# Patient Record
Sex: MALE | NOT HISPANIC OR LATINO
[De-identification: names, ages, dates, MRNs, and addresses within clinical notes are randomized per-mention and may not be internally consistent; named-entity substitution may affect disease eponyms.]

---

## 2022-04-18 PROBLEM — Z00.00 ENCOUNTER FOR PREVENTIVE HEALTH EXAMINATION: Status: ACTIVE | Noted: 2022-04-18

## 2022-04-28 ENCOUNTER — APPOINTMENT (OUTPATIENT)
Dept: UROLOGY | Facility: CLINIC | Age: 32
End: 2022-04-28

## 2022-05-10 ENCOUNTER — APPOINTMENT (OUTPATIENT)
Dept: UROLOGY | Facility: CLINIC | Age: 32
End: 2022-05-10

## 2022-05-17 ENCOUNTER — APPOINTMENT (OUTPATIENT)
Dept: UROLOGY | Facility: CLINIC | Age: 32
End: 2022-05-17
Payer: MEDICAID

## 2022-05-17 PROCEDURE — 99204 OFFICE O/P NEW MOD 45 MIN: CPT | Mod: 95

## 2022-05-17 NOTE — HISTORY OF PRESENT ILLNESS
[FreeTextEntry1] : Patient is a 32-year-old gentleman with a 3-1/2-year-old child who presents with the chief complaint of rapid ejaculation for evaluation. I reviewed the questionnaire he had completed with him in detail.  The patient states this has been present for many years.  He has tried using a condom, anesthetic spray, start and stop technique which have all not decreased his rapid ejaculation.  He states his he ejaculates shortly, but certainly less than a minute, after penetration.  The only thing that seems to improve his latency time is alcohol.  He does not want to use alcohol though for this purpose.  The patient denies fevers, chills, nausea and/or vomiting and no unexplained weight loss. He has no known drug allergies.  His past medical history demonstrates no significant urologic issues.  In his present occupation as a  he has no known toxin exposure.  He does smoke and drinks only socially.  He has no known drug allergies.  His review of systems is non-contributory. His family history is not significant.

## 2022-05-17 NOTE — ASSESSMENT
[FreeTextEntry1] : This pleasant gentleman presents with concerns regarding rapid ejaculation.  I have requested several baseline blood studies.   Urine analysis was also requested.  I will have him back for a physical examination, to review his blood studies, and discuss options for therapy.  I will make more specific recommendations after the results of the requested tests return.\par \par Telehealth Consultation: 40 minutes  20 minutes reviewing his history and discussing prior results.  20 minutes discussing various treatment options, writing requests for lab testing and writing his note. There was also additional time in preparing for the visit and assisting the patient with technology issues he was having with the telehealth platform.\par

## 2022-05-17 NOTE — LETTER BODY
[FreeTextEntry1] : Dear ,\par \par Thank you for referring your patient Tk Lucero for consultation for rapid ejaculation.  I have requested several baseline blood studies. I will see the patient back in followup shortly and make further recommendations. I have attached a copy of my consultation note for your records.\par \par Thank you again for this kind referral. I will certainly keep you updated with further progress. Please do not hesitate to call me if you have any questions.\par \par Best regards,\par \par \par \par Jhoan Stoner M.D., PhD\par Professor of Urology\par    [facility] [employer] [facility]\par  for Quality\par Director, Reproductive and Sexual Medicine\par    [facility]

## 2022-06-09 LAB
25(OH)D3 SERPL-MCNC: 22.4 NG/ML
ALBUMIN SERPL ELPH-MCNC: 4.8 G/DL
ALP BLD-CCNC: 53 U/L
ALT SERPL-CCNC: 25 U/L
ANION GAP SERPL CALC-SCNC: 12 MMOL/L
APPEARANCE: CLEAR
AST SERPL-CCNC: 27 U/L
BACTERIA: NEGATIVE
BASOPHILS # BLD AUTO: 0.04 K/UL
BASOPHILS NFR BLD AUTO: 0.6 %
BILIRUB SERPL-MCNC: 0.3 MG/DL
BILIRUBIN URINE: NEGATIVE
BLOOD URINE: NEGATIVE
BUN SERPL-MCNC: 9 MG/DL
CALCIUM SERPL-MCNC: 10 MG/DL
CHLORIDE SERPL-SCNC: 101 MMOL/L
CHOLEST SERPL-MCNC: 235 MG/DL
CO2 SERPL-SCNC: 26 MMOL/L
COLOR: COLORLESS
CREAT SERPL-MCNC: 1.06 MG/DL
EGFR: 97 ML/MIN/1.73M2
EOSINOPHIL # BLD AUTO: 0.26 K/UL
EOSINOPHIL NFR BLD AUTO: 4.1 %
ESTIMATED AVERAGE GLUCOSE: 103 MG/DL
ESTRADIOL SERPL-MCNC: 13 PG/ML
FSH SERPL-MCNC: 1.5 IU/L
GLUCOSE QUALITATIVE U: NEGATIVE
GLUCOSE SERPL-MCNC: 109 MG/DL
HBA1C MFR BLD HPLC: 5.2 %
HCT VFR BLD CALC: 43.1 %
HDLC SERPL-MCNC: 58 MG/DL
HGB BLD-MCNC: 14.3 G/DL
HYALINE CASTS: 0 /LPF
IMM GRANULOCYTES NFR BLD AUTO: 0.3 %
KETONES URINE: NEGATIVE
LDLC SERPL CALC-MCNC: 123 MG/DL
LEUKOCYTE ESTERASE URINE: NEGATIVE
LH SERPL-ACNC: 4.2 IU/L
LYMPHOCYTES # BLD AUTO: 2.01 K/UL
LYMPHOCYTES NFR BLD AUTO: 32.1 %
MAN DIFF?: NORMAL
MCHC RBC-ENTMCNC: 27.9 PG
MCHC RBC-ENTMCNC: 33.2 GM/DL
MCV RBC AUTO: 84 FL
MICROSCOPIC-UA: NORMAL
MONOCYTES # BLD AUTO: 0.49 K/UL
MONOCYTES NFR BLD AUTO: 7.8 %
NEUTROPHILS # BLD AUTO: 3.45 K/UL
NEUTROPHILS NFR BLD AUTO: 55.1 %
NITRITE URINE: NEGATIVE
NONHDLC SERPL-MCNC: 177 MG/DL
PH URINE: 6
PLATELET # BLD AUTO: 344 K/UL
POTASSIUM SERPL-SCNC: 4.5 MMOL/L
PROLACTIN SERPL-MCNC: 6.2 NG/ML
PROT SERPL-MCNC: 7.5 G/DL
PROTEIN URINE: NEGATIVE
RBC # BLD: 5.13 M/UL
RBC # FLD: 13.2 %
RED BLOOD CELLS URINE: 0 /HPF
SODIUM SERPL-SCNC: 139 MMOL/L
SPECIFIC GRAVITY URINE: 1
SQUAMOUS EPITHELIAL CELLS: 0 /HPF
TESTOST SERPL-MCNC: 287 NG/DL
TRIGL SERPL-MCNC: 270 MG/DL
TSH SERPL-ACNC: 1.09 UIU/ML
UROBILINOGEN URINE: NORMAL
WBC # FLD AUTO: 6.27 K/UL
WHITE BLOOD CELLS URINE: 0 /HPF

## 2022-06-16 LAB
TESTOST FREE SERPL-MCNC: 7.7 PG/ML
TESTOST SERPL-MCNC: 287 NG/DL

## 2022-06-23 ENCOUNTER — APPOINTMENT (OUTPATIENT)
Dept: UROLOGY | Facility: CLINIC | Age: 32
End: 2022-06-23

## 2022-12-10 PROBLEM — F52.4 PREMATURE EJACULATION: Status: ACTIVE | Noted: 2022-05-17

## 2022-12-10 PROBLEM — I86.1 VARICOCELE: Status: ACTIVE | Noted: 2022-05-09

## 2022-12-13 ENCOUNTER — APPOINTMENT (OUTPATIENT)
Dept: UROLOGY | Facility: CLINIC | Age: 32
End: 2022-12-13

## 2022-12-13 VITALS
SYSTOLIC BLOOD PRESSURE: 137 MMHG | HEIGHT: 65 IN | BODY MASS INDEX: 28.32 KG/M2 | WEIGHT: 170 LBS | DIASTOLIC BLOOD PRESSURE: 86 MMHG | RESPIRATION RATE: 17 BRPM | HEART RATE: 67 BPM

## 2022-12-13 DIAGNOSIS — I86.1 SCROTAL VARICES: ICD-10-CM

## 2022-12-13 DIAGNOSIS — F52.4 PREMATURE EJACULATION: ICD-10-CM

## 2022-12-13 PROCEDURE — 99214 OFFICE O/P EST MOD 30 MIN: CPT

## 2022-12-13 NOTE — ASSESSMENT
[FreeTextEntry1] : The patient was last seen in May 6, 2017 2022 with rapid ejaculation by telehealth. He has 1 min latency. He states there is improvement with Start-Stop and etoh. Hr has tried the Promescent spray. His hematocrit was 43.1%, hemoglobin A1c 5.2%, estradiol 13 pg/mL, LH 4.2 IU/L, vitamin D 25 was slightly low at 22.4 ng/mL, testosterone 287 ng/dL with a free testosterone of 7.7 pg/mL.  His prolactin was 6.2 ng/mL and FSH 1.5 IU/L, TSH 1.09 µIU/mL.  He had dyslipidemia with triglycerides 270 mg/dL, cholesterol 235 mg/dL and  mg/dL\par \par Discussed behavioral aproaches and gave him book references. Will fu in 3 to 6 months.\par \par Consultation: 30 minutes:  10 minutes reviewing his history and performing a physical examination.  20 minutes reviewing PE and ways to improve, reviewing  blood studies ,discussing treatment options and writing his note. There was also additional time in preparation for today's visit.\par \par

## 2022-12-13 NOTE — PHYSICAL EXAM
[General Appearance - Well Developed] : well developed [General Appearance - Well Nourished] : well nourished [Normal Appearance] : normal appearance [Well Groomed] : well groomed [General Appearance - In No Acute Distress] : no acute distress [Bowel Sounds] : normal bowel sounds [Abdomen Soft] : soft [Abdomen Tenderness] : non-tender [Abdomen Mass (___ Cm)] : no abdominal mass palpated [Abdomen Hernia] : no hernia was discovered [Costovertebral Angle Tenderness] : no ~M costovertebral angle tenderness [Urethral Meatus] : meatus normal [Urinary Bladder Findings] : the bladder was normal on palpation [Scrotum] : the scrotum was normal [Epididymis] : the epididymides were normal [Testes Tenderness] : no tenderness of the testes [Testes Mass (___cm)] : there were no testicular masses [Anus Abnormality] : the anus and perineum were normal [No Prostate Nodules] : no prostate nodules [Skin Color & Pigmentation] : normal skin color and pigmentation [Skin Turgor] : supple [Skin Lesions] : no skin lesions [Heart Rate And Rhythm] : Heart rate and rhythm were normal [Arterial Pulses Normal] : the pedal pulses were normal [Edema] : no peripheral edema [] : no respiratory distress [Respiration, Rhythm And Depth] : normal respiratory rhythm and effort [Exaggerated Use Of Accessory Muscles For Inspiration] : no accessory muscle use [Auscultation Breath Sounds / Voice Sounds] : lungs were clear to auscultation bilaterally [Chest Palpation] : palpation of the chest revealed no abnormalities [Lungs Percussion] : the lungs were normal to percussion [Oriented To Time, Place, And Person] : oriented to person, place, and time [Affect] : the affect was normal [Mood] : the mood was normal [Not Anxious] : not anxious [Normal Station and Gait] : the gait and station were normal for the patient's age [No Focal Deficits] : no focal deficits [Sensation] : the sensory exam was normal to light touch and pinprick [Motor Exam] : the motor exam was normal [No Palpable Adenopathy] : no palpable adenopathy [Cervical Lymph Nodes Enlarged Posterior Bilaterally] : posterior cervical [Cervical Lymph Nodes Enlarged Anterior Bilaterally] : anterior cervical [Supraclavicular Lymph Nodes Enlarged Bilaterally] : supraclavicular [Axillary Lymph Nodes Enlarged Bilaterally] : axillary [Femoral Lymph Nodes Enlarged Bilaterally] : femoral [Inguinal Lymph Nodes Enlarged Bilaterally] : inguinal [Penis Abnormality] : normal circumcised penis

## 2022-12-13 NOTE — HISTORY OF PRESENT ILLNESS
[FreeTextEntry1] : The patient was last seen in May 6, 2017 2022 with rapid ejaculation by telehealth. He has 1 min latency. He states there is improvement with Start-Stop and etoh. Hr has tried the Promescent spray.\par \par PMH: Patient is a 32-year-old gentleman with a 3-1/2-year-old child who presents with the chief complaint of rapid ejaculation for evaluation. I reviewed the questionnaire he had completed with him in detail.  The patient states this has been present for many years.  He has tried using a condom, anesthetic spray, start and stop technique which have all not decreased his rapid ejaculation.  He states his he ejaculates shortly, but certainly less than a minute, after penetration.  The only thing that seems to improve his latency time is alcohol.  He does not want to use alcohol though for this purpose.  The patient denies fevers, chills, nausea and/or vomiting and no unexplained weight loss. He has no known drug allergies.  His past medical history demonstrates no significant urologic issues.  In his present occupation as a  he has no known toxin exposure.  He does smoke and drinks only socially.  He has no known drug allergies.  His review of systems is non-contributory. His family history is not significant.